# Patient Record
Sex: MALE | Employment: PART TIME | URBAN - METROPOLITAN AREA
[De-identification: names, ages, dates, MRNs, and addresses within clinical notes are randomized per-mention and may not be internally consistent; named-entity substitution may affect disease eponyms.]

---
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
## 2021-02-25 NOTE — PROGRESS NOTES
Physical Therapy at [facility],   a part of [address], [address] [place] [address]  Phone: [phone]  Fax: [phone]    Discharge Summary  2-15    Patient name: Dariana Cuellar  : 2000  Provider#:[id number]  Referral source: Lanny Kelley PA-C      Medical/Treatment Diagnosis: Cervicalgia [M54.2]  Low back pain [M54.5]     Prior Hospitalization: see medical history     Comorbidities: see chart  Prior Level of Function:see chart  Medications: Verified on Patient Summary List    Start of Care: 2020      Onset Date:7/3/2020   Visits from Start of Care: 5     Missed Visits: 2  Reporting Period : 2020 to 2020    ASSESSMENT/SUMMARY OF CARE: Pt made minimal progress w/ PT over 4 weeks. Pt stopped attending PT but was still experiencing high levels of pain. Pt has been given HEP and will be d/c'd from PT at this time.     RECOMMENDATIONS:  [x]Discontinue therapy: []Patient has reached or is progressing toward set goals      [x]Patient is non-compliant or has abdicated      []Due to lack of appreciable progress towards set goals    Kyeon Quinteros, DPT, OCS 2021

## 2021-05-11 ENCOUNTER — OFFICE VISIT (OUTPATIENT)
Dept: INTERNAL MEDICINE CLINIC | Age: 21
End: 2021-05-11
Payer: OTHER GOVERNMENT

## 2021-05-11 VITALS
OXYGEN SATURATION: 97 % | TEMPERATURE: 98.9 F | RESPIRATION RATE: 20 BRPM | WEIGHT: 109 LBS | HEIGHT: 65 IN | SYSTOLIC BLOOD PRESSURE: 111 MMHG | HEART RATE: 74 BPM | DIASTOLIC BLOOD PRESSURE: 79 MMHG | BODY MASS INDEX: 18.16 KG/M2

## 2021-05-11 DIAGNOSIS — R21 RASH: Primary | ICD-10-CM

## 2021-05-11 PROCEDURE — 99213 OFFICE O/P EST LOW 20 MIN: CPT | Performed by: PHYSICIAN ASSISTANT

## 2021-05-11 RX ORDER — DIPHENHYDRAMINE HCL 25 MG
25 TABLET ORAL
COMMUNITY

## 2021-05-11 RX ORDER — METHYLPREDNISOLONE 4 MG/1
TABLET ORAL
Qty: 1 DOSE PACK | Refills: 0 | Status: SHIPPED | OUTPATIENT
Start: 2021-05-11

## 2021-05-11 NOTE — PROGRESS NOTES
Patient states he has bruises that have a pain rate of 6. Patient c/o vidhi, swollen toe on right foot big toe, did have swelling in both ankles, x1 week.

## 2021-05-11 NOTE — PROGRESS NOTES
Chief Complaint   Patient presents with    Swelling     he is a 22y.o. year old male who presents for evaluation. Patient presents to the office for evaluation of a rash. He states the rash started about one week ago. He reports the rash seems to be worse in the evening. The rash has been on his arms, ankle, right foot, back and right buttock. He states he can tell sometimes before the rash starts because it is mildly sore. He reports he has not been doing or using anything different. He states he has been using [employer] detergent but that has been for 2-3 months now. He has been taking benadryl which does help. Reviewed PmHx, RxHx, FmHx, SocHx, AllgHx and updated and dated in the chart. Review of Systems - negative except as listed above    Objective:     Vitals:    05/11/21 1528   BP: 111/79   Pulse: 74   Resp: 20   Temp: 98.9 °F (37.2 °C)   TempSrc: Temporal   SpO2: 97%   Weight: 109 lb (49.4 kg)   Height: 5' 5\" (1.651 m)     Physical Examination: General appearance - alert, well appearing, and in no distress  Skin - right wrist- slightly hyperpigment dry appearing patch, multiple slightly raised erythremic papules noted on back arms and foot. I am not able to appreciate a rash on his buttock    Assessment/ Plan:   Diagnoses and all orders for this visit:    1. Rash  -     methylPREDNISolone (MEDROL DOSEPACK) 4 mg tablet; Use as directed. -     REFERRAL TO ALLERGY  -     CBC WITH AUTOMATED DIFF; Future  -     METABOLIC PANEL, COMPREHENSIVE; Future     I explained to the patient I am not sure as to the cause of his rash. I would like for him to get a mild detergent and start to wash with dove soap. Labs ordered and he will be contact with results once back. Referral to see an allergist. I explained to the patient that unfortunately sometime we do not find out the cause of the rash. I have discussed the diagnosis with the patient and the intended plan as seen in the above orders.   The patient has received an after-visit summary and questions were answered concerning future plans.      Medication Side Effects and Warnings were discussed with patient: yes  Patient Labs were reviewed and or requested: yes  Patient Past Records were reviewed and or requested  yes    Odessa Kaplan PA-C